# Patient Record
Sex: MALE | Race: WHITE | Employment: OTHER | URBAN - NONMETROPOLITAN AREA
[De-identification: names, ages, dates, MRNs, and addresses within clinical notes are randomized per-mention and may not be internally consistent; named-entity substitution may affect disease eponyms.]

---

## 2022-06-16 ENCOUNTER — HOSPITAL ENCOUNTER (EMERGENCY)
Age: 57
Discharge: HOME | End: 2022-06-16
Payer: OTHER GOVERNMENT

## 2022-06-16 ENCOUNTER — APPOINTMENT (OUTPATIENT)
Dept: GENERAL RADIOLOGY | Age: 57
End: 2022-06-16
Payer: OTHER GOVERNMENT

## 2022-06-16 VITALS
SYSTOLIC BLOOD PRESSURE: 161 MMHG | HEART RATE: 63 BPM | RESPIRATION RATE: 21 BRPM | OXYGEN SATURATION: 100 % | DIASTOLIC BLOOD PRESSURE: 83 MMHG | HEIGHT: 63 IN | BODY MASS INDEX: 33.66 KG/M2 | WEIGHT: 190 LBS | TEMPERATURE: 98.4 F

## 2022-06-16 DIAGNOSIS — I10 PRIMARY HYPERTENSION: ICD-10-CM

## 2022-06-16 DIAGNOSIS — R07.89 ATYPICAL CHEST PAIN: Primary | ICD-10-CM

## 2022-06-16 DIAGNOSIS — M54.6 CHRONIC BILATERAL THORACIC BACK PAIN: ICD-10-CM

## 2022-06-16 DIAGNOSIS — G89.29 CHRONIC BILATERAL THORACIC BACK PAIN: ICD-10-CM

## 2022-06-16 LAB
ALBUMIN SERPL-MCNC: 4 G/DL (ref 3.5–5.1)
ALP BLD-CCNC: 139 U/L (ref 38–126)
ALT SERPL-CCNC: 22 U/L (ref 11–66)
ANION GAP SERPL CALCULATED.3IONS-SCNC: 10 MEQ/L (ref 8–16)
AST SERPL-CCNC: 29 U/L (ref 5–40)
BASOPHILS # BLD: 0.6 %
BASOPHILS ABSOLUTE: 0 THOU/MM3 (ref 0–0.1)
BILIRUB SERPL-MCNC: 0.4 MG/DL (ref 0.3–1.2)
BILIRUBIN DIRECT: < 0.2 MG/DL (ref 0–0.3)
BUN BLDV-MCNC: 7 MG/DL (ref 7–22)
CALCIUM SERPL-MCNC: 9.1 MG/DL (ref 8.5–10.5)
CHLORIDE BLD-SCNC: 99 MEQ/L (ref 98–111)
CO2: 26 MEQ/L (ref 23–33)
CREAT SERPL-MCNC: 0.7 MG/DL (ref 0.4–1.2)
EKG ATRIAL RATE: 72 BPM
EKG P AXIS: 55 DEGREES
EKG P-R INTERVAL: 142 MS
EKG Q-T INTERVAL: 400 MS
EKG QRS DURATION: 94 MS
EKG QTC CALCULATION (BAZETT): 438 MS
EKG R AXIS: -2 DEGREES
EKG T AXIS: 38 DEGREES
EKG VENTRICULAR RATE: 72 BPM
EOSINOPHIL # BLD: 2 %
EOSINOPHILS ABSOLUTE: 0.1 THOU/MM3 (ref 0–0.4)
ERYTHROCYTE [DISTWIDTH] IN BLOOD BY AUTOMATED COUNT: 12.7 % (ref 11.5–14.5)
ERYTHROCYTE [DISTWIDTH] IN BLOOD BY AUTOMATED COUNT: 41.6 FL (ref 35–45)
GFR SERPL CREATININE-BSD FRML MDRD: > 90 ML/MIN/1.73M2
GLUCOSE BLD-MCNC: 277 MG/DL (ref 70–108)
HCT VFR BLD CALC: 45.3 % (ref 42–52)
HEMOGLOBIN: 15.6 GM/DL (ref 14–18)
IMMATURE GRANS (ABS): 0.02 THOU/MM3 (ref 0–0.07)
IMMATURE GRANULOCYTES: 0.3 %
LIPASE: 16.4 U/L (ref 5.6–51.3)
LYMPHOCYTES # BLD: 28.1 %
LYMPHOCYTES ABSOLUTE: 1.8 THOU/MM3 (ref 1–4.8)
MCH RBC QN AUTO: 31 PG (ref 26–33)
MCHC RBC AUTO-ENTMCNC: 34.4 GM/DL (ref 32.2–35.5)
MCV RBC AUTO: 89.9 FL (ref 80–94)
MONOCYTES # BLD: 7.5 %
MONOCYTES ABSOLUTE: 0.5 THOU/MM3 (ref 0.4–1.3)
NUCLEATED RED BLOOD CELLS: 0 /100 WBC
OSMOLALITY CALCULATION: 278 MOSMOL/KG (ref 275–300)
PLATELET # BLD: 169 THOU/MM3 (ref 130–400)
PMV BLD AUTO: 11.4 FL (ref 9.4–12.4)
POTASSIUM SERPL-SCNC: 4.2 MEQ/L (ref 3.5–5.2)
RBC # BLD: 5.04 MILL/MM3 (ref 4.7–6.1)
SEG NEUTROPHILS: 61.5 %
SEGMENTED NEUTROPHILS ABSOLUTE COUNT: 3.9 THOU/MM3 (ref 1.8–7.7)
SODIUM BLD-SCNC: 135 MEQ/L (ref 135–145)
TOTAL PROTEIN: 6.5 G/DL (ref 6.1–8)
TROPONIN T: < 0.01 NG/ML
WBC # BLD: 6.4 THOU/MM3 (ref 4.8–10.8)

## 2022-06-16 PROCEDURE — 71045 X-RAY EXAM CHEST 1 VIEW: CPT

## 2022-06-16 PROCEDURE — 80053 COMPREHEN METABOLIC PANEL: CPT

## 2022-06-16 PROCEDURE — 36415 COLL VENOUS BLD VENIPUNCTURE: CPT

## 2022-06-16 PROCEDURE — 93005 ELECTROCARDIOGRAM TRACING: CPT | Performed by: EMERGENCY MEDICINE

## 2022-06-16 PROCEDURE — 99285 EMERGENCY DEPT VISIT HI MDM: CPT

## 2022-06-16 PROCEDURE — 83690 ASSAY OF LIPASE: CPT

## 2022-06-16 PROCEDURE — 82248 BILIRUBIN DIRECT: CPT

## 2022-06-16 PROCEDURE — 93010 ELECTROCARDIOGRAM REPORT: CPT | Performed by: NUCLEAR MEDICINE

## 2022-06-16 PROCEDURE — 85025 COMPLETE CBC W/AUTO DIFF WBC: CPT

## 2022-06-16 PROCEDURE — 84484 ASSAY OF TROPONIN QUANT: CPT

## 2022-06-16 ASSESSMENT — HEART SCORE: ECG: 0

## 2022-06-16 ASSESSMENT — ENCOUNTER SYMPTOMS
COUGH: 1
SHORTNESS OF BREATH: 1
CHEST TIGHTNESS: 1

## 2022-06-16 ASSESSMENT — PAIN - FUNCTIONAL ASSESSMENT: PAIN_FUNCTIONAL_ASSESSMENT: NONE - DENIES PAIN

## 2022-06-16 ASSESSMENT — PAIN DESCRIPTION - PAIN TYPE: TYPE: CHRONIC PAIN

## 2022-06-16 ASSESSMENT — PAIN DESCRIPTION - FREQUENCY: FREQUENCY: INTERMITTENT

## 2022-06-16 NOTE — ED NOTES
DC instructions and f/u care reviewed w/pt. Pt denies any pains at this time.       Kiara Barreto RN  06/16/22 3674

## 2022-06-16 NOTE — ED TRIAGE NOTES
Pt to ED via private vehicle from [facility] for f/u and sent in ER for further evaluation of abnormal EKG. Pt denies all pains at this time. Placed on cardiac monitor and in gown. EKG obtained. Protocol orders initiated. Pt rprts chest and back tightness w/activity. Pt alert and oriented x4. Breathing easy and unlabored on RA w/ocassional non-productive cough.

## 2022-06-16 NOTE — ED PROVIDER NOTES
[facility]  eMERGENCY dEPARTMENT eNCOUnter          [address] [facility]       Chief Complaint   Patient presents with    Back Pain    Chest Pain     INTERMITTENT W/ACTIVITY       Nurses Notes reviewed and I agree except as noted in the HPI. HISTORY OF PRESENT ILLNESS    Ana Castillo is a 59 y.o. male who presents to the Emergency Department for the evaluation of some chest and back pain. Patient states he has a history of cervical fusion, CT 3-C7. States that he has noticed for the past 3 to 4 months some pain in his back, just between the shoulder blades that occurs very briefly with positional changes, notably when coughing, standing up, bending or anything that causes a sudden jolt. He states that around the same time, he began noticing some soreness in the chest that feels almost as though he was punched or pulled a muscle. This lasted approximately 1 second and occurs with the same activities. He has had a cough ongoing for the past few months as well which does seem to have progressively worsened. Cough is dry without any associated fever and he reports occasional postnasal drainage from the sinuses. Reports some slight shortness of breath on exertion when he cuts the grass but denies any associated chest pain or pressure with this. States that he gets an occasional heartbeat in his ear, denies this currently. Reports history of hypertension and hyperlipidemia, was previously on medications for this but has not been on them for the past 2 to 3 years. No history of diabetes but is a 1 pack/day smoker and reports family history of heart disease in his father who had heart disease starting in his 46s, reportedly had triple CABG and carotid endarterectomy. Patient reports having had stress test in 2007 or 2008 that was reportedly normal.  He has never had a heart cath and has never seen cardiology.   Reports some chronic abdominal pain from IBS with heartburn associated, was on Nexium when in the [facility] but currently only takes occasional Prilosec or Tums for symptoms. Denies any change in his symptoms today, but when he went to his provider at the [place] for follow-up from ED visit 2 days ago (bloody stools - resolved), states that they did not like his EKG and sent him in for further evaluation. He denies any lower extremity edema, orthopnea, dizziness, syncope. The HPI was provided by the patient. REVIEW OF SYSTEMS     Review of Systems   Constitutional: Negative for fever. Respiratory: Positive for cough, chest tightness and shortness of breath. Cardiovascular: Positive for chest pain. Negative for leg swelling. Gastrointestinal: Abdominal pain: chronic, at baseline. All other systems reviewed and are negative. PAST MEDICAL HISTORY    has no past medical history on file. SURGICAL HISTORY      has no past surgical history on file. CURRENT MEDICATIONS       There are no discharge medications for this patient. ALLERGIES     has No Known Allergies. FAMILY HISTORY     has no family status information on file. family history is not on file. SOCIAL HISTORY      reports that he has been smoking cigarettes. He has been smoking about 1.00 pack per day. He does not have any smokeless tobacco history on file. He reports previous alcohol use. He reports that he does not use drugs. PHYSICAL EXAM     INITIAL VITALS:  height is 5' 3\" (1.6 m) and weight is 190 lb (86.2 kg). His oral temperature is 98.4 °F (36.9 °C). His blood pressure is 161/83 (abnormal) and his pulse is 63. His respiration is 21 and oxygen saturation is 100%. Physical Exam  Vitals and nursing note reviewed. HENT:      Head: Normocephalic and atraumatic. Eyes:      Conjunctiva/sclera: Conjunctivae normal.   Cardiovascular:      Rate and Rhythm: Normal rate and regular rhythm. Pulses: Normal pulses. Heart sounds: Normal heart sounds. No murmur heard.       Pulmonary: Effort: Pulmonary effort is normal. No respiratory distress. Breath sounds: Normal breath sounds. No wheezing or rhonchi. Chest:      Chest wall: No tenderness. Abdominal:      Palpations: Abdomen is soft. Tenderness: There is abdominal tenderness (Mild, diffuse). There is no guarding or rebound. Musculoskeletal:         General: No tenderness. Cervical back: Normal and normal range of motion. Thoracic back: Normal. No tenderness or bony tenderness. Right lower leg: No edema. Left lower leg: No edema. Skin:     General: Skin is warm and dry. Neurological:      General: No focal deficit present. Mental Status: He is alert and oriented to person, place, and time. Psychiatric:         Mood and Affect: Mood normal.         DIFFERENTIAL DIAGNOSIS:   Differential diagnoses are discussed    DIAGNOSTIC RESULTS     EKG: All EKG's are interpreted by the Emergency Department Physician who either signs or Co-signsthis chart in the absence of a cardiologist.    Vent. Rate: 72 bpm  PRinterval: 142 ms  QRS duration: 94 ms  QTc: 438 ms  P-R-T axes: 55, -2, 38  NSR. No STEMI. RADIOLOGY: non-plain film images(s) such as CT, Ultrasound and MRI are read by the radiologist.    XR CHEST PORTABLE   Final Result   No acute cardiopulmonary disease            **This report has been created using voice recognition software. It may contain minor errors which are inherent in voice recognition technology. **      Final report electronically signed by Dr. Anish Pool on 6/16/2022 10:29 AM          LABS:      Labs Reviewed   BASIC METABOLIC PANEL - Abnormal; Notable for the following components:       Result Value    Glucose 277 (*)     All other components within normal limits   HEPATIC FUNCTION PANEL - Abnormal; Notable for the following components:    Alkaline Phosphatase 139 (*)     All other components within normal limits   CBC WITH AUTO DIFFERENTIAL   TROPONIN   LIPASE   ANION GAP GLOMERULAR FILTRATION RATE, ESTIMATED   OSMOLALITY       EMERGENCY DEPARTMENT COURSE:   Vitals:    Vitals:    06/16/22 1030 06/16/22 1100 06/16/22 1130 06/16/22 1230   BP: (!) 140/89 (!) 147/78 (!) 144/94 (!) 161/83   Pulse: 73 67 64 63   Resp: 14 18 24 21   Temp:       TempSrc:       SpO2: 96% 92% 90% 100%   Weight:       Height:          10:31 AM EDT: The patient was seen and evaluated. Patient presents with mild hypertension and otherwise reassuring vital signs complaining of visit to his PCP to follow-up on an ED visit from a couple days ago where he was noted to have bloody stools with negative work-up. He apparently had an abnormal EKG in the office today. This was not sent with him and his EKG here was without any concerning acute ischemic changes. He does report recurrent chest pain associated with movement of the thoracic spine which sounds musculoskeletal in nature. However, he does I denies gone body have multiple untreated risk factors and family history. His laboratory and imaging work-up today was nonacute. Discussed with attending provider. He is agreeable with outpatient cardiology follow-up. We did offer to arrange an appointment with our hospital cardiology group but the patient declined as he follows with the [place] and will contact his provider to get this arranged. Strict return precautions were discussed. He has not yet filled the medications he was given on his recent ED visit but does acknowledge frequent heartburn symptoms for which he takes occasional Prilosec and Tums and was previously on Nexium. He is encouraged to fill the previously prescribed medications. CRITICAL CARE:   None     CONSULTS:  None    PROCEDURES:  None    FINAL IMPRESSION      1. Atypical chest pain    2. Chronic bilateral thoracic back pain    3.  Primary hypertension          DISPOSITION/PLAN   Discharge    PATIENT REFERRED TO:  Your family doctor at the [place]    Call today      [address] [facility]  [address] [place] [address]  [phone]    If symptoms worsen      DISCHARGEMEDICATIONS:  There are no discharge medications for this patient.       (Please note that portions of this note were completedwith a voice recognition program.  Efforts were made to edit the dictations but occasionally words are mis-transcribed.)       Tasha Fletcher PA-C  06/22/22 [id number]

## 2022-06-16 NOTE — ED NOTES
Pt up to side of bed using urinal. No visible signs of distress noted.       Ana Avila RN  06/16/22 [id number]

## 2022-07-06 ENCOUNTER — HOSPITAL ENCOUNTER (OUTPATIENT)
Dept: CT IMAGING | Age: 57
Discharge: HOME | End: 2022-07-06
Payer: OTHER GOVERNMENT

## 2022-07-06 DIAGNOSIS — Z87.891 PERSONAL HISTORY OF NICOTINE DEPENDENCE: ICD-10-CM

## 2022-07-06 DIAGNOSIS — Z87.891 PERSONAL HISTORY OF TOBACCO USE: ICD-10-CM

## 2022-07-06 DIAGNOSIS — F17.210 CIGARETTE SMOKER: ICD-10-CM

## 2022-07-06 PROCEDURE — 71271 CT THORAX LUNG CANCER SCR C-: CPT

## 2025-05-26 ENCOUNTER — HOSPITAL ENCOUNTER (OUTPATIENT)
Dept: CT IMAGING | Age: 60
Discharge: HOME | End: 2025-05-26
Payer: OTHER GOVERNMENT

## 2025-05-26 DIAGNOSIS — S06.9X0A BRAIN INJURY, WITHOUT LOSS OF CONSCIOUSNESS, INITIAL ENCOUNTER (HCC): ICD-10-CM

## 2025-05-26 PROCEDURE — 70450 CT HEAD/BRAIN W/O DYE: CPT
